# Patient Record
Sex: MALE | Race: WHITE | Employment: OTHER | URBAN - METROPOLITAN AREA
[De-identification: names, ages, dates, MRNs, and addresses within clinical notes are randomized per-mention and may not be internally consistent; named-entity substitution may affect disease eponyms.]

---

## 2017-05-24 ENCOUNTER — OFFICE VISIT (OUTPATIENT)
Dept: CARDIOLOGY CLINIC | Age: 77
End: 2017-05-24

## 2017-05-24 VITALS
SYSTOLIC BLOOD PRESSURE: 142 MMHG | HEART RATE: 58 BPM | HEIGHT: 70 IN | WEIGHT: 235 LBS | OXYGEN SATURATION: 94 % | BODY MASS INDEX: 33.64 KG/M2 | DIASTOLIC BLOOD PRESSURE: 80 MMHG

## 2017-05-24 DIAGNOSIS — E78.00 HYPERCHOLESTEROLEMIA: ICD-10-CM

## 2017-05-24 DIAGNOSIS — I25.10 ATHEROSCLEROSIS OF NATIVE CORONARY ARTERY OF NATIVE HEART WITHOUT ANGINA PECTORIS: Primary | ICD-10-CM

## 2017-05-24 DIAGNOSIS — I49.3 PVC (PREMATURE VENTRICULAR CONTRACTION): ICD-10-CM

## 2017-05-24 DIAGNOSIS — I10 ESSENTIAL HYPERTENSION: ICD-10-CM

## 2017-05-24 NOTE — PROGRESS NOTES
HISTORY OF PRESENT ILLNESS  Jany Cruz is a 68 y.o. male. HPI  He has been doing well. He has remained asymptomatic. He has had no chest pain, dyspnea, orthopnea or PND. He denies palpitations, dizziness or syncope. He has had no symptoms to indicate TIA or amaurosis fugax. He has had mild intermittent leg edema that is most likely secondary to calcium channel blockade. He has known coronary artery disease documented by cardiac catheterization in February 1999, and had repeat cardiac catheterization in September 2006 at the [facility] which demonstrated:    1. A long 80% to 90% segmental stenosis of the proximal and left anterior descending artery all the way up to the left main trunk. The left main trunk, however, is a very small segment which has virtually a double ostium.    2. Large, codominant circumflex artery with a questionable stenosis in the mid segment, which appears to be more foreshortening rather than a true lesion.    3. Patent codominant right coronary artery.    4. Normal left ventricular systolic function, with estimated EF in the 60% range.    The cardiologist at the [facility] recommended that he have coronary artery bypass grafting to the LAD and the distal circumflex branch. Reviewing his coronary angiogram, it was felt that he could be best treated with stenting of the LAD, since the circumflex artery lesion did not appear to be significant.  He subsequently underwent stenting of the LAD on October 23, 2006, successfully, with a 3 x 33-mm Cypher stent.    His LV function was well preserved and ejection fraction was in the 55-60% range by echocardiogram. He did have the PVCs which he has been known to have had for a number of years.   Andrew Pozo  He underwent stress nuclear cardiac imaging as required by the [facility] Beth Reyes for his  license on 8/86/50 which demonstrated small area of distal posterolateral wall defect which was partially reversible and there was some defect in the inferior wall consistent with diaphragmatic attenuation. The ejection fraction was estimated in the 51% range. Because of equivocally abnormal stress nuclear cardiac imaging, he underwent stress echocardiogram to clarify on 9/11/13 which demonstrated no significant wall motion abnormalities with exercise to indicate ischemia. The estimated ejection fraction was 55% which increased up to 60-65% with exercise. His cholesterol profile has been satisfactory and his hemoglobin A1c is down to 6.3%. He has had PVCs which appear to be benign and he has had no clinical indication of ventricular tachycardia thus far.    He had repeat stress nuclear cardiac imaging on 10/2/14 as required by the [address] [place] [address]  to maintain his  license. I personally reviewed his perfusion imaging pictures which appears to be essentially unchanged compared to one from 8/27/13. There is a small to moderate size scarring in the basal inferior and mid inferior wall which may well be scar or diaphragmatic attenuation and also some fixed defect in the distal posterolateral wall. There appears to be no reversibility indicating ischemia. Once again, he was found to have PVCs during the test which was reported and observed in the past. He is known to have had the PVCs for many number of years. The ejection fraction was estimated in the 48% range, however he did have reduced ejection fraction in August 2013 by gated spec imaging in the 51% range. However ,during the stress echocardiogram, he was found to have baseline EF In the 55% range which increased up to 60-65% range with exercise at that time in September 2013. There appears to be a great possibility of gating error at the time of stress nuclear cardiac imaging because of the PVCs.  At any rate, there appears to be no significant change in the EF between the stress nuclear cardiac imaging in August 2013 and October 2014.    He underwent the annual stress nuclear cardiac imaging for his  license on 10/13/15, which demonstrated no evidence of significant perfusion imaging abnormalities to indicate the scarring or ischemia.  The ejection fraction was calculated at 42%, which could well be related to a gating error because of the PVCs.                    He has developed an allergic reaction to Lisinopril with lip and tongue swelling and, subsequently, the Lisinopril has been replaced with amlodipine.   He underwent a Holter monitor on 11/16/15 to evaluate the PVCs, which demonstrated baseline normal sinus rhythm. There were multiple PVCs with some paired beats or trigeminy and runs of bigeminy, but no ventricular tachycardia. There were some PACs and thirteen short runs of SVT with the longest of 82 beats at a rate of 159 BPM. There were no significant bradycardia pauses. He had repeat stress nuclear cardiac imaging, which was required for his s license, on 16/47/1424, which demonstrated no significant perfusion abnormalities to indicate ischemia or infarction. Ejection fraction was in the range of 53%. Review of Systems   Constitutional: Negative for malaise/fatigue and weight loss. HENT: Negative for hearing loss. Eyes: Negative for blurred vision and double vision. Respiratory: Negative for shortness of breath. Cardiovascular: Positive for leg swelling. Negative for chest pain, palpitations, orthopnea, claudication and PND. Gastrointestinal: Negative for blood in stool, heartburn and melena. Genitourinary: Positive for frequency. Negative for dysuria, hematuria and urgency. Musculoskeletal: Positive for joint pain. Negative for back pain. Skin: Negative for itching and rash. Neurological: Negative for dizziness, loss of consciousness, weakness and headaches. Psychiatric/Behavioral: Negative for depression and memory loss. Physical Exam   Constitutional: He is oriented to person, place, and time. He appears well-developed and well-nourished.    HENT:   Head: Normocephalic and atraumatic. Eyes: Conjunctivae are normal. Pupils are equal, round, and reactive to light. Neck: Normal range of motion. Neck supple. No JVD present. Cardiovascular: Regular rhythm, S1 normal and S2 normal.   Extrasystoles are present. Bradycardia present. PMI is not displaced. Exam reveals no gallop and no friction rub. No murmur heard. Pulses:       Carotid pulses are 3+ on the right side, and 3+ on the left side. Pulmonary/Chest: Effort normal. He has no rales. Abdominal: Soft. There is no tenderness. Musculoskeletal: He exhibits edema. trace   Neurological: He is alert and oriented to person, place, and time. No cranial nerve deficit. Skin: Skin is warm and dry. Psychiatric: He has a normal mood and affect. His behavior is normal.     Visit Vitals    /80 (BP 1 Location: Left arm, BP Patient Position: Sitting)    Pulse (!) 58    Ht 5' 10\" (1.778 m)    Wt 106.6 kg (235 lb)    SpO2 94%    BMI 33.72 kg/m2       Past Medical History:   Diagnosis Date    Cardiac cath 10/23/2006    LM patent. oLAD 85% (3 x 33 mm Cypher stent). mCx 60%. RCA patent.  Cardiac Holter monitor study 11/16/2015    Sinus rhythm, avg HR 69 bpm (range  bpm). Freq VEs, mainly single but also paired, trigeminy & bigeminy. Occasional SVE, mainly single but some pairs. Thirteen runs of SVT, longest 82 beats at 159 bpm.  No significant pauses.  Cardiac nuclear imaging test 11/21/2016    No ischemia or prior infarction. No RWMA. EF 53%. Neg EKG on pharm stress test.    Cardiac stress echocardiogram 09/11/2013    Negative maximal stress echo by EKG & echo criteria. No chest pain. EF 60-65%. No new RWMA. Ex time 9 min 31 sec.     Coronary artery disease     status post stenting of the LAD in Oct. 2006/EF 60%    Diabetes mellitus ([facility] [address]     Hypercholesterolemia     Hypertension      PVC (premature ventricular contraction)        Social History     Social History    Marital status:      Spouse name: N/A    Number of children: N/A    Years of education: N/A     Occupational History    Not on file. Social History Main Topics    Smoking status: Never Smoker    Smokeless tobacco: Never Used    Alcohol use Yes      Comment: occasional    Drug use: No    Sexual activity: Not on file     Other Topics Concern    Not on file     Social History Narrative       History reviewed. No pertinent family history. Past Surgical History:   Procedure Laterality Date    HX CORONARY STENT PLACEMENT  10/23/06    Stenting of LAD (3 x 33-mm Cypher stent)    HX HEART CATHETERIZATION  02/1999    HX HEART CATHETERIZATION  09/2006       Current Outpatient Prescriptions   Medication Sig Dispense Refill    ERGOCALCIFEROL, VITAMIN D2, (VITAMIN D2 PO) Take  by mouth.  CYANOCOBALAMIN, VITAMIN B-12, (VITAMIN B-12 PO) Take  by mouth.  amLODIPine (NORVASC) 5 mg tablet daily. 0    ibuprofen (MOTRIN) 800 mg tablet Take  by mouth.  nitroglycerin (NITROQUICK) 0.4 mg SL tablet 1 Tab by SubLINGual route every five (5) minutes as needed. 25 Tab 3    metoprolol succinate (TOPROL XL) 25 mg XL tablet Take 1 Tab by mouth daily. 90 Tab 3    clopidogrel (PLAVIX) 75 mg tablet Take 1 Tab by mouth daily. 90 Tab 3    aspirin 81 mg tablet Take 81 mg by mouth daily.  metformin (GLUCOPHAGE) 500 mg tablet Take 500 mg by mouth daily (with breakfast).  simvastatin (ZOCOR) 40 mg tablet Take 40 mg by mouth nightly.  ascorbic acid (VITAMIN C) 500 mg tablet Take 500 mg by mouth daily.  folic acid 806 mcg tablet Take 400 mcg by mouth daily. EKG: unchanged from previous tracings, nonspecific ST and T waves changes, sinus bradycardia, left axis deviation. ASSESSMENT and PLAN  Encounter Diagnoses   Name Primary?  Coronary artery disease, status post stenting of the LAD in October 2006/EF 55- 60%.  Yes    PVC (premature ventricular contraction)     Essential hypertension     Hypercholesterolemia    He has been doing well. He has had no symptoms to indicate angina or cardiac decompensation. His blood pressure has been under control. His leg edema is related to calcium channel blockade, but not severe enough to discontinue medication. He has had benign PVCs chronically. He does not seem to have had any major ventricular arrhythmia. At this point, he will be continued on his current medical regimen. He has remained active and he was encouraged to exercise more regularly.

## 2017-05-24 NOTE — MR AVS SNAPSHOT
Visit Information Date & Time Provider Department Dept. Phone Encounter #  
 5/24/2017 10:40 AM Asher Mueller MD [facility] [phone] [id number] Follow-up Instructions Return in about 6 months (around 11/24/2017). Upcoming Health Maintenance Date Due  
 FOOT EXAM Q1 11/9/1950 MICROALBUMIN Q1 11/9/1950 EYE EXAM RETINAL OR DILATED Q1 11/9/1950 DTaP/Tdap/Td series (1 - Tdap) 11/9/1961 ZOSTER VACCINE AGE 60> 11/9/2000 GLAUCOMA SCREENING Q2Y 11/9/2005 Pneumococcal 65+ Low/Medium Risk (1 of 2 - PCV13) 11/9/2005 MEDICARE YEARLY EXAM 11/9/2005 HEMOGLOBIN A1C Q6M 4/3/2015 LIPID PANEL Q1 10/3/2015 INFLUENZA AGE 9 TO ADULT 8/1/2017 Allergies as of 5/24/2017  Review Complete On: 5/24/2017 By: Asher Mueller MD  
  
 Severity Noted Reaction Type Reactions Ivp Dye [Fd And C Blue No.1]  01/13/2011    Unknown (comments) Lisinopril  11/03/2015    Swelling Current Immunizations  Never Reviewed No immunizations on file. Not reviewed this visit You Were Diagnosed With   
  
 Codes Comments Atherosclerosis of native coronary artery of native heart without angina pectoris    -  Primary ICD-10-CM: I25.10 ICD-9-CM: 414.01   
 PVC (premature ventricular contraction)     ICD-10-CM: I49.3 ICD-9-CM: 427.69 Essential hypertension     ICD-10-CM: I10 
ICD-9-CM: 401.9 Vitals BP Pulse Height(growth percentile) Weight(growth percentile) SpO2 BMI  
 142/80 (BP 1 Location: Left arm, BP Patient Position: Sitting) (!) 58 5' 10\" (1.778 m) 235 lb (106.6 kg) 94% 33.72 kg/m2 Smoking Status Never Smoker Vitals History BMI and BSA Data Body Mass Index Body Surface Area 33.72 kg/m 2 2.29 m 2 Preferred Pharmacy Pharmacy Name Phone [facility] [phone] [place] [phone] [place] [phone] Aaron Mina Your Updated Medication List  
  
   
 This list is accurate as of: 5/24/17 11:24 AM.  Always use your most recent med list. amLODIPine 5 mg tablet Commonly known as:  NORVASC  
daily. aspirin 81 mg tablet Take 81 mg by mouth daily. clopidogrel 75 mg Tab Commonly known as:  PLAVIX Take 1 Tab by mouth daily. folic acid 919 mcg tablet Take 400 mcg by mouth daily. GLUCOPHAGE 500 mg tablet Generic drug:  metFORMIN Take 500 mg by mouth daily (with breakfast). ibuprofen 800 mg tablet Commonly known as:  MOTRIN Take  by mouth.  
  
 metoprolol succinate 25 mg XL tablet Commonly known as:  TOPROL XL Take 1 Tab by mouth daily. nitroglycerin 0.4 mg SL tablet Commonly known as:  [place] 1 Tab by SubLINGual route every five (5) minutes as needed. VITAMIN B-12 PO Take  by mouth. VITAMIN C 500 mg tablet Generic drug:  ascorbic acid (vitamin C) Take 500 mg by mouth daily. VITAMIN D2 PO Take  by mouth. ZOCOR 40 mg tablet Generic drug:  simvastatin Take 40 mg by mouth nightly. We Performed the Following AMB POC EKG ROUTINE W/ 12 LEADS, INTER & REP [46574 CPT(R)] Follow-up Instructions Return in about 6 months (around 11/24/2017). Introducing [facility]! [employer] introduces [employer] patient portal. Now you can access parts of your medical record, email your doctor's office, and request medication refills online. 1. In your internet browser, go to [email or web address] 2. Click on the First Time User? Click Here link in the Sign In box. You will see the New Member Sign Up page. 3. Enter your [employer] Access Code exactly as it appears below. You will not need to use this code after youve completed the sign-up process. If you do not sign up before the expiration date, you must request a new code. · [employer] Access Code: [email or web address] Expires: 8/22/2017 11:24 AM 
 
 4. Enter the last four digits of your Social Security Number (xxxx) and Date of Birth (mm/dd/yyyy) as indicated and click Submit. You will be taken to the next sign-up page. 5. Create a [employer] ID. This will be your [employer] login ID and cannot be changed, so think of one that is secure and easy to remember. 6. Create a [employer] password. You can change your password at any time. 7. Enter your Password Reset Question and Answer. This can be used at a later time if you forget your password. 8. Enter your e-mail address. You will receive e-mail notification when new information is available in [address] 9. Click Sign Up. You can now view and download portions of your medical record. 10. Click the Download Summary menu link to download a portable copy of your medical information. If you have questions, please visit the Frequently Asked Questions section of the [employer] website. Remember, [employer] is NOT to be used for urgent needs. For medical emergencies, dial 911. Now available from your iPhone and Android! Please provide this summary of care documentation to your next provider. Your primary care clinician is listed as Moises Hester. If you have any questions after today's visit, please call [phone].

## 2018-02-12 ENCOUNTER — OFFICE VISIT (OUTPATIENT)
Dept: CARDIOLOGY CLINIC | Age: 78
End: 2018-02-12

## 2018-02-12 VITALS
HEIGHT: 70 IN | DIASTOLIC BLOOD PRESSURE: 90 MMHG | HEART RATE: 68 BPM | SYSTOLIC BLOOD PRESSURE: 140 MMHG | BODY MASS INDEX: 33.18 KG/M2 | WEIGHT: 231.8 LBS | OXYGEN SATURATION: 96 %

## 2018-02-12 DIAGNOSIS — I49.3 PVC (PREMATURE VENTRICULAR CONTRACTION): ICD-10-CM

## 2018-02-12 DIAGNOSIS — I51.9 DIASTOLIC DYSFUNCTION, LEFT VENTRICLE: ICD-10-CM

## 2018-02-12 DIAGNOSIS — E78.00 HYPERCHOLESTEROLEMIA: ICD-10-CM

## 2018-02-12 DIAGNOSIS — I25.10 ATHEROSCLEROSIS OF NATIVE CORONARY ARTERY OF NATIVE HEART WITHOUT ANGINA PECTORIS: ICD-10-CM

## 2018-02-12 DIAGNOSIS — R06.09 DOE (DYSPNEA ON EXERTION): Primary | ICD-10-CM

## 2018-02-12 DIAGNOSIS — I10 ESSENTIAL HYPERTENSION: ICD-10-CM

## 2018-02-12 NOTE — PATIENT INSTRUCTIONS
Record blood pressure readings for 2 weeks and drop them off to the office for Dr Gama Heading to review    [phone] fax number
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
## 2018-02-12 NOTE — MR AVS SNAPSHOT
[address] [address] [address] [address] [phone] Patient: Bandar Marie MRN: [id number] ILDA:[birth year] Visit Information Date & Time Provider Department Dept. Phone Encounter #  
 2/12/2018 10:20 AM Osmar Vargas MD [facility] [address] [id number] Upcoming Health Maintenance Date Due  
 FOOT EXAM Q1 11/9/1950 MICROALBUMIN Q1 11/9/1950 EYE EXAM RETINAL OR DILATED Q1 11/9/1950 DTaP/Tdap/Td series (1 - Tdap) 11/9/1961 ZOSTER VACCINE AGE 60> 9/9/2000 GLAUCOMA SCREENING Q2Y 11/9/2005 Pneumococcal 65+ Low/Medium Risk (1 of 2 - PCV13) 11/9/2005 MEDICARE YEARLY EXAM 11/9/2005 HEMOGLOBIN A1C Q6M 4/3/2015 LIPID PANEL Q1 10/3/2015 Influenza Age 5 to Adult 8/1/2017 Allergies as of 2/12/2018  Review Complete On: 2/12/2018 By: Osmar Vargas MD  
  
 Severity Noted Reaction Type Reactions Ivp Dye [Fd And C Blue No.1]  01/13/2011    Unknown (comments) Lisinopril  11/03/2015    Swelling Current Immunizations  Never Reviewed No immunizations on file. Not reviewed this visit You Were Diagnosed With   
  
 Codes Comments Atherosclerosis of native coronary artery of native heart without angina pectoris    -  Primary ICD-10-CM: I25.10 ICD-9-CM: 414.01   
 PVC (premature ventricular contraction)     ICD-10-CM: I49.3 ICD-9-CM: 427.69 Essential hypertension     ICD-10-CM: I10 
ICD-9-CM: 401.9 Hypercholesterolemia     ICD-10-CM: E78.00 ICD-9-CM: 272.0 Vitals BP Pulse Height(growth percentile) Weight(growth percentile) SpO2 BMI  
 140/90 68 5' 10\" (1.778 m) 231 lb 12.8 oz (105.1 kg) 96% 33.26 kg/m2 Smoking Status Never Smoker BMI and BSA Data Body Mass Index Body Surface Area  
 33.26 kg/m 2 2.28 m 2 Preferred Pharmacy Pharmacy Name Phone [facility] [address] [address] Aaron Mina Your Updated Medication List  
  
   
This list is accurate as of: 2/12/18 10:59 AM.  Always use your most recent med list. amLODIPine 5 mg tablet Commonly known as:  NORVASC  
daily. aspirin 81 mg tablet Take 81 mg by mouth daily. clopidogrel 75 mg Tab Commonly known as:  PLAVIX Take 1 Tab by mouth daily. folic acid 379 mcg tablet Take 400 mcg by mouth daily. GLUCOPHAGE 500 mg tablet Generic drug:  metFORMIN Take 500 mg by mouth daily (with breakfast). ibuprofen 800 mg tablet Commonly known as:  MOTRIN Take  by mouth.  
  
 metoprolol succinate 25 mg XL tablet Commonly known as:  TOPROL XL Take 1 Tab by mouth daily. nitroglycerin 0.4 mg SL tablet Commonly known as:  Stoney Nunnery 1 Tab by SubLINGual route every five (5) minutes as needed. omega 3-dha-epa-fish oil 100-160-1,000 mg Cap Take  by mouth. VITAMIN B-12 PO Take  by mouth. VITAMIN C 500 mg tablet Generic drug:  ascorbic acid (vitamin C) Take 500 mg by mouth daily. VITAMIN D2 PO Take  by mouth. ZOCOR 40 mg tablet Generic drug:  simvastatin Take 40 mg by mouth nightly. We Performed the Following AMB POC EKG ROUTINE W/ 12 LEADS, INTER & REP [63784 CPT(R)] Patient Instructions Record blood pressure readings for 2 weeks and drop them off to the office for Dr Sussy Scott to review [phone] fax number Introducing [facility]! Esvin Martinez introduces [employer] patient portal. Now you can access parts of your medical record, email your doctor's office, and request medication refills online. 1. In your internet browser, go to [email or web address] 2. Click on the First Time User? Click Here link in the Sign In box. You will see the New Member Sign Up page. 3. Enter your Vinja Access Code exactly as it appears below. You will not need to use this code after youve completed the sign-up process. If you do not sign up before the expiration date, you must request a new code. · Vinja Access Code: [email or web address] Expires: 5/13/2018 10:59 AM 
 
4. Enter the last four digits of your Social Security Number (xxxx) and Date of Birth (mm/dd/yyyy) as indicated and click Submit. You will be taken to the next sign-up page. 5. Create a Vinja ID. This will be your Vinja login ID and cannot be changed, so think of one that is secure and easy to remember. 6. Create a Vinja password. You can change your password at any time. 7. Enter your Password Reset Question and Answer. This can be used at a later time if you forget your password. 8. Enter your e-mail address. You will receive e-mail notification when new information is available in [address] 9. Click Sign Up. You can now view and download portions of your medical record. 10. Click the Download Summary menu link to download a portable copy of your medical information. If you have questions, please visit the Frequently Asked Questions section of the Vinja website. Remember, Vinja is NOT to be used for urgent needs. For medical emergencies, dial 911. Now available from your iPhone and Android! Please provide this summary of care documentation to your next provider. Your primary care clinician is listed as Maria Esther Wells. If you have any questions after today's visit, please call [phone].

## 2018-02-12 NOTE — PROGRESS NOTES
1. Have you been to the ER, urgent care clinic since your last visit? Hospitalized since your last visit? No    2. Have you seen or consulted any other health care providers outside of the [address] [place] [address] since your last visit? Include any pap smears or colon screening.  No

## 2018-12-05 ENCOUNTER — OFFICE VISIT (OUTPATIENT)
Dept: CARDIOLOGY CLINIC | Age: 78
End: 2018-12-05

## 2018-12-05 VITALS
OXYGEN SATURATION: 97 % | DIASTOLIC BLOOD PRESSURE: 80 MMHG | SYSTOLIC BLOOD PRESSURE: 130 MMHG | HEART RATE: 58 BPM | HEIGHT: 70 IN | BODY MASS INDEX: 32.93 KG/M2 | WEIGHT: 230 LBS

## 2018-12-05 DIAGNOSIS — E78.00 HYPERCHOLESTEROLEMIA: ICD-10-CM

## 2018-12-05 DIAGNOSIS — I25.10 ATHEROSCLEROSIS OF NATIVE CORONARY ARTERY OF NATIVE HEART WITHOUT ANGINA PECTORIS: Primary | ICD-10-CM

## 2018-12-05 DIAGNOSIS — I49.3 PVC (PREMATURE VENTRICULAR CONTRACTION): ICD-10-CM

## 2018-12-05 DIAGNOSIS — R06.09 DOE (DYSPNEA ON EXERTION): ICD-10-CM

## 2018-12-05 DIAGNOSIS — I51.9 DIASTOLIC DYSFUNCTION, LEFT VENTRICLE: ICD-10-CM

## 2018-12-05 DIAGNOSIS — I10 ESSENTIAL HYPERTENSION: ICD-10-CM

## 2018-12-05 NOTE — PROGRESS NOTES
Jerad Westbrook. presents today for Chief Complaint Patient presents with  Follow-up  
  9 month Jerad Westbrook. preferred language for health care discussion is english/other. Is someone accompanying this pt? Yes, wife Is the patient using any DME equipment during OV? No 
 
Depression Screening: PHQ over the last two weeks 4/2/2014 Little interest or pleasure in doing things Not at all Feeling down, depressed, irritable, or hopeless Not at all Total Score PHQ 2 0 Learning Assessment: 
Learning Assessment 4/2/2014 PRIMARY LEARNER Patient HIGHEST LEVEL OF EDUCATION - PRIMARY LEARNER  > 4 YEARS OF COLLEGE  
BARRIERS PRIMARY LEARNER NONE  
CO-LEARNER CAREGIVER No  
PRIMARY LANGUAGE ENGLISH  NEED No  
LEARNER PREFERENCE PRIMARY DEMONSTRATION  
  LISTENING  
  PICTURES  
  READING  
ANSWERED BY patient RELATIONSHIP SELF Abuse Screening: No flowsheet data found. Fall Risk Fall Risk Assessment, last 12 mths 4/2/2014 Able to walk? Yes Fall in past 12 months? No  
 
 
Pt currently taking Antiplatelet therapy? Plavix and Aspirin Coordination of Care: 1. Have you been to the ER, urgent care clinic since your last visit? Hospitalized since your last visit? No 
 
2. Have you seen or consulted any other health care providers outside of the [address] [place] [address] since your last visit? Include any pap smears or colon screening.  No

## 2018-12-05 NOTE — PROGRESS NOTES
HISTORY OF PRESENT ILLNESS 
Chelsy Nichole is a 66 y.o. male. HPI He has been doing very well. He denies any cardiac symptoms. He denies chest pain, dyspnea, orthopnea, PND. He denies palpitation, dizziness or syncope. He denies any symptoms of TIA or amaurosis fugax. 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 Cardiac catheterization in September 2006 at the [facility] which demonstrated:   
1. A long 80% to 90% segmental stenosis of the proximal and left anterior descending artery all the way up to the left main trunk. The left main trunk, however, is a very small segment which has virtually a double ostium.   
2. Large, codominant circumflex artery with a questionable stenosis in the mid segment, which appears to be more foreshortening rather than a true lesion.   
3. Patent codominant right coronary artery.   
4. Normal left ventricular systolic function, with estimated EF in the 60% range.   
The cardiologist at the [facility] recommended that he have coronary artery bypass grafting to the LAD and the distal circumflex branch. Reviewing his coronary angiogram, it was felt that he could be best treated with stenting of the LAD, since the circumflex artery lesion did not appear to be significant. He subsequently underwent stenting of the LAD on October 23, 2006, successfully, with a 3 x 33-mm Cypher stent.   
His LV function was well preserved and ejection fraction was in the 55-60% range by echocardiogram. He did have the PVCs which he has been known to have had for a number of years.  
[facility] 
He underwent stress nuclear cardiac imaging as required by the [address] [place], [place] [address]mer[place]ez Reyes for his  license on 7/02/69 which demonstrated small area of distal posterolateral wall defect which was partially reversible and there was some defect in the inferior wall consistent with diaphragmatic attenuation. The ejection fraction was estimated in the 51% range.  Because of equivocally abnormal stress nuclear cardiac imaging, he underwent stress echocardiogram to clarify on 9/11/13 which demonstrated no significant wall motion abnormalities with exercise to indicate ischemia. The estimated ejection fraction was 55% which increased up to 60-65% with exercise. His cholesterol profile has been satisfactory and his hemoglobin A1c is down to 6.3%. He has had PVCs which appear to be benign and he has had no clinical indication of ventricular tachycardia thus far.   
He had repeat stress nuclear cardiac imaging on 10/2/14 as required by the [address] [place] [address]  to maintain his  license. I personally reviewed his perfusion imaging pictures which appears to be essentially unchanged compared to one from 8/27/13. There is a small to moderate size scarring in the basal inferior and mid inferior wall which may well be scar or diaphragmatic attenuation and also some fixed defect in the distal posterolateral wall. There appears to be no reversibility indicating ischemia. Once again, he was found to have PVCs during the test which was reported and observed in the past. He is known to have had the PVCs for many number of years. The ejection fraction was estimated in the 48% range, however he did have reduced ejection fraction in August 2013 by gated spec imaging in the 51% range. However ,during the stress echocardiogram, he was found to have baseline EF In the 55% range which increased up to 60-65% range with exercise at that time in September 2013. There appears to be a great possibility of gating error at the time of stress nuclear cardiac imaging because of the PVCs.  At any rate, there appears to be no significant change in the EF between the stress nuclear cardiac imaging in August 2013 and October 2014.   
He underwent the annual stress nuclear cardiac imaging for his  license on 50/60/70, which demonstrated no evidence of significant perfusion imaging abnormalities to indicate the scarring or ischemia.  The ejection fraction was calculated at 42%, which could well be related to a gating error because of the PVCs.                   
He has developed an allergic reaction to Lisinopril with lip and tongue swelling and, subsequently, the Lisinopril has been replaced with amlodipine.  He underwent a Holter monitor on 11/16/15 to evaluate the PVCs, which demonstrated baseline normal sinus rhythm. There were multiple PVCs with some paired beats or trigeminy and runs of bigeminy, but no ventricular tachycardia. There were some PACs and thirteen short runs of SVT with the longest of 82 beats at a rate of 159 BPM. There were no significant bradycardia pauses. He had repeat stress nuclear cardiac imaging, which was required for his s license, on 66/50/2855, which demonstrated no significant perfusion abnormalities to indicate ischemia or infarction. Ejection fraction was in the range of 53%. Review of Systems Constitutional: Negative for malaise/fatigue and weight loss. HENT: Negative for hearing loss. Eyes: Negative for blurred vision and double vision. Respiratory: Negative for shortness of breath. Cardiovascular: Positive for leg swelling. Negative for chest pain, palpitations, orthopnea, claudication and PND. Gastrointestinal: Negative for blood in stool, heartburn and melena. Genitourinary: Positive for frequency. Negative for dysuria, hematuria and urgency. Musculoskeletal: Positive for joint pain. Negative for back pain. Skin: Negative for itching and rash. Neurological: Negative for dizziness, loss of consciousness and weakness. Psychiatric/Behavioral: Negative for depression and memory loss. Physical Exam  
Constitutional: He is oriented to person, place, and time. He appears well-developed and well-nourished. HENT:  
Head: Normocephalic and atraumatic. Eyes: Conjunctivae are normal. Pupils are equal, round, and reactive to light. Neck: Normal range of motion. Neck supple. No JVD present. Cardiovascular: Regular rhythm, S1 normal and S2 normal.  No extrasystoles are present. Bradycardia present. PMI is not displaced. Exam reveals no gallop and no friction rub. No murmur heard. Pulses: 
     Carotid pulses are 3+ on the right side, and 3+ on the left side. Pulmonary/Chest: Effort normal. He has no rales. Abdominal: Soft. There is no tenderness. Musculoskeletal: He exhibits no edema. Neurological: He is alert and oriented to person, place, and time. No cranial nerve deficit. Skin: Skin is warm and dry. Psychiatric: He has a normal mood and affect. His behavior is normal.  
 
Visit Vitals /80 Pulse (!) 58 Ht 5' 10\" (1.778 m) Wt 104.3 kg (230 lb) SpO2 97% BMI 33.00 kg/m² Past Medical History:  
Diagnosis Date  Cardiac cath 10/23/2006 LM patent. oLAD 85% (3 x 33 mm Cypher stent). mCx 60%. RCA patent.  Cardiac Holter monitor study 11/16/2015 Sinus rhythm, avg HR 69 bpm (range  bpm). Freq VEs, mainly single but also paired, trigeminy & bigeminy. Occasional SVE, mainly single but some pairs. Thirteen runs of SVT, longest 82 beats at 159 bpm.  No significant pauses.  Cardiac nuclear imaging test 11/21/2016 No ischemia or prior infarction. No RWMA. EF 53%. Neg EKG on pharm stress test.  
 Cardiac stress echocardiogram 09/11/2013 Negative maximal stress echo by EKG & echo criteria. No chest pain. EF 60-65%. No new RWMA. Ex time 9 min 31 sec.  Coronary artery disease   
 status post stenting of the LAD in Oct. 2006/EF 60%  Diabetes mellitus ([address]  Hypercholesterolemia  Hypertension  PVC (premature ventricular contraction) Social History Socioeconomic History  Marital status:  Spouse name: Not on file  Number of children: Not on file  Years of education: Not on file  Highest education level: Not on file Social Needs  Financial resource strain: Not on file  Food insecurity - worry: Not on file  Food insecurity - inability: Not on file  Transportation needs - medical: Not on file  Transportation needs - non-medical: Not on file Occupational History  Not on file Tobacco Use  Smoking status: Never Smoker  Smokeless tobacco: Never Used Substance and Sexual Activity  Alcohol use: Yes Comment: occasional  
 Drug use: No  
 Sexual activity: Not on file Other Topics Concern  Not on file Social History Narrative  Not on file History reviewed. No pertinent family history. Past Surgical History:  
Procedure Laterality Date  HX CORONARY STENT PLACEMENT  10/23/06 Stenting of LAD (3 x 33-mm Cypher stent)  HX HEART CATHETERIZATION  02/1999  HX HEART CATHETERIZATION  09/2006 Current Outpatient Medications Medication Sig Dispense Refill  omega 3-dha-epa-fish oil 100-160-1,000 mg cap Take  by mouth.  ERGOCALCIFEROL, VITAMIN D2, (VITAMIN D2 PO) Take  by mouth.  CYANOCOBALAMIN, VITAMIN B-12, (VITAMIN B-12 PO) Take  by mouth.  amLODIPine (NORVASC) 5 mg tablet daily. 0  
 ibuprofen (MOTRIN) 800 mg tablet Take  by mouth.  nitroglycerin (NITROQUICK) 0.4 mg SL tablet 1 Tab by SubLINGual route every five (5) minutes as needed. 25 Tab 3  
 metoprolol succinate (TOPROL XL) 25 mg XL tablet Take 1 Tab by mouth daily. 90 Tab 3  clopidogrel (PLAVIX) 75 mg tablet Take 1 Tab by mouth daily. 90 Tab 3  
 aspirin 81 mg tablet Take 81 mg by mouth daily.  metformin (GLUCOPHAGE) 500 mg tablet Take 500 mg by mouth daily (with breakfast).  simvastatin (ZOCOR) 40 mg tablet Take 40 mg by mouth nightly.  ascorbic acid (VITAMIN C) 500 mg tablet Take 500 mg by mouth daily.  folic acid 550 mcg tablet Take 400 mcg by mouth daily. EKG: unchanged from previous tracings, normal sinus rhythm, nonspecific ST and T waves changes, left axis deviation, upper limit SD,rotation of the heart Irina Cantor ASSESSMENT and PLAN Encounter Diagnoses Name Primary?  Atherosclerosis of native coronary artery of native heart without angina pectoris Yes Comment: Stent LAD Oct.2006,EF 55-60%  MENDOZA (dyspnea on exertion)  PVC (premature ventricular contraction)  Essential hypertension  Hypercholesterolemia  Diastolic dysfunction, left ventricle He has been doing very well. He has had no symptoms to indicate angina or cardiac decompensation. He has had no symptoms to indicate significant cardiac arrhythmia. His blood pressure has been under control. For now, continue on current medical regimen. He will have follow up stress nuclear cardiac imaging next year.

## 2018-12-06 RX ORDER — NITROGLYCERIN 0.4 MG/1
0.4 TABLET SUBLINGUAL
Qty: 25 TAB | Refills: 3 | Status: SHIPPED | OUTPATIENT
Start: 2018-12-06

## 2019-06-05 ENCOUNTER — OFFICE VISIT (OUTPATIENT)
Dept: CARDIOLOGY CLINIC | Age: 79
End: 2019-06-05

## 2019-06-05 DIAGNOSIS — I51.9 DIASTOLIC DYSFUNCTION, LEFT VENTRICLE: ICD-10-CM

## 2019-06-05 DIAGNOSIS — I49.3 PVC (PREMATURE VENTRICULAR CONTRACTION): ICD-10-CM

## 2019-06-05 DIAGNOSIS — E78.00 HYPERCHOLESTEROLEMIA: ICD-10-CM

## 2019-06-05 DIAGNOSIS — I10 ESSENTIAL HYPERTENSION: ICD-10-CM

## 2019-06-05 DIAGNOSIS — I25.10 ATHEROSCLEROSIS OF NATIVE CORONARY ARTERY OF NATIVE HEART WITHOUT ANGINA PECTORIS: Primary | ICD-10-CM

## 2019-06-05 NOTE — PROGRESS NOTES
HISTORY OF PRESENT ILLNESS  Dionicio Hess is a 66 y.o. male. HPI  He has been doing very well. He is still flying an airplane. He has had no chest pain, dyspnea, orthopnea, PND. He denies palpitations, dizziness or syncope. He has had no symptoms to indicate TIA or amaurosis fugax. Cardiac catheterization in September 2006 at the [facility] which demonstrated:    1. A long 80% to 90% segmental stenosis of the proximal and left anterior descending artery all the way up to the left main trunk. The left main trunk, however, is a very small segment which has virtually a double ostium.    2. Large, codominant circumflex artery with a questionable stenosis in the mid segment, which appears to be more foreshortening rather than a true lesion.    3. Patent codominant right coronary artery.    4. Normal left ventricular systolic function, with estimated EF in the 60% range.    The cardiologist at the [facility] recommended that he have coronary artery bypass grafting to the LAD and the distal circumflex branch. Reviewing his coronary angiogram, it was felt that he could be best treated with stenting of the LAD, since the circumflex artery lesion did not appear to be significant. He subsequently underwent stenting of the LAD on October 23, 2006, successfully, with a 3 x 33-mm Cypher stent.    His LV function was well preserved and ejection fraction was in the 55-60% range by echocardiogram. He did have the PVCs which he has been known to have had for a number of years.   Ruy Chirinos  He underwent stress nuclear cardiac imaging as required by the [address] [place] [address]  for his  license on 4/10/32 which demonstrated small area of distal posterolateral wall defect which was partially reversible and there was some defect in the inferior wall consistent with diaphragmatic attenuation. The ejection fraction was estimated in the 51% range.  Because of equivocally abnormal stress nuclear cardiac imaging, he underwent stress echocardiogram to clarify on 9/11/13 which demonstrated no significant wall motion abnormalities with exercise to indicate ischemia. The estimated ejection fraction was 55% which increased up to 60-65% with exercise. His cholesterol profile has been satisfactory and his hemoglobin A1c is down to 6.3%. He has had PVCs which appear to be benign and he has had no clinical indication of ventricular tachycardia thus far.    He had repeat stress nuclear cardiac imaging on 10/2/14 as required by the [address] [place] [address]  to maintain his  license. I personally reviewed his perfusion imaging pictures which appears to be essentially unchanged compared to one from 8/27/13. There is a small to moderate size scarring in the basal inferior and mid inferior wall which may well be scar or diaphragmatic attenuation and also some fixed defect in the distal posterolateral wall. There appears to be no reversibility indicating ischemia. Once again, he was found to have PVCs during the test which was reported and observed in the past. He is known to have had the PVCs for many number of years. The ejection fraction was estimated in the 48% range, however he did have reduced ejection fraction in August 2013 by gated spec imaging in the 51% range. However ,during the stress echocardiogram, he was found to have baseline EF In the 55% range which increased up to 60-65% range with exercise at that time in September 2013. There appears to be a great possibility of gating error at the time of stress nuclear cardiac imaging because of the PVCs.  At any rate, there appears to be no significant change in the EF between the stress nuclear cardiac imaging in August 2013 and October 2014.    He underwent the annual stress nuclear cardiac imaging for his  license on 69/67/22, which demonstrated no evidence of significant perfusion imaging abnormalities to indicate the scarring or ischemia.  The ejection fraction was calculated at 42%, which could well be related to a gating error because of the PVCs.                    He has developed an allergic reaction to Lisinopril with lip and tongue swelling and, subsequently, the Lisinopril has been replaced with amlodipine.   He underwent a Holter monitor on 11/16/15 to evaluate the PVCs, which demonstrated baseline normal sinus rhythm. There were multiple PVCs with some paired beats or trigeminy and runs of bigeminy, but no ventricular tachycardia. There were some PACs and thirteen short runs of SVT with the longest of 82 beats at a rate of 159 BPM. There were no significant bradycardia pauses. He had repeat stress nuclear cardiac imaging, which was required for his s license, on 12/80/7898, which demonstrated no significant perfusion abnormalities to indicate ischemia or infarction. Ejection fraction was in the range of 53%.       Review of Systems   Constitutional: Negative for malaise/fatigue and weight loss. HENT: Negative for hearing loss. Eyes: Negative for blurred vision and double vision. Respiratory: Negative for shortness of breath. Cardiovascular: Negative for chest pain, palpitations, orthopnea, claudication, leg swelling and PND. Gastrointestinal: Negative for blood in stool, heartburn and melena. Genitourinary: Positive for frequency. Negative for dysuria, hematuria and urgency. Musculoskeletal: Negative for back pain and joint pain. Skin: Negative for itching and rash. Neurological: Negative for dizziness and loss of consciousness. Psychiatric/Behavioral: Negative for depression and memory loss. Physical Exam   Constitutional: He is oriented to person, place, and time. He appears well-developed and well-nourished. HENT:   Head: Normocephalic and atraumatic. Eyes: Pupils are equal, round, and reactive to light. Conjunctivae are normal.   Neck: Normal range of motion. Neck supple. No JVD present.    Cardiovascular: Normal rate, regular rhythm, S1 normal and S2 normal.  No extrasystoles are present. PMI is not displaced. Exam reveals no gallop and no friction rub. No murmur heard. Pulses:       Carotid pulses are 3+ on the right side, and 3+ on the left side. Pulmonary/Chest: Effort normal. He has no rales. Abdominal: Soft. There is no tenderness. Musculoskeletal: He exhibits no edema. Neurological: He is alert and oriented to person, place, and time. No cranial nerve deficit. Skin: Skin is warm and dry. Psychiatric: He has a normal mood and affect. His behavior is normal.     There were no vitals taken for this visit. Past Medical History:   Diagnosis Date    Cardiac cath 10/23/2006    LM patent. oLAD 85% (3 x 33 mm Cypher stent). mCx 60%. RCA patent.  Cardiac Holter monitor study 11/16/2015    Sinus rhythm, avg HR 69 bpm (range  bpm). Freq VEs, mainly single but also paired, trigeminy & bigeminy. Occasional SVE, mainly single but some pairs. Thirteen runs of SVT, longest 82 beats at 159 bpm.  No significant pauses.  Cardiac nuclear imaging test 11/21/2016    No ischemia or prior infarction. No RWMA. EF 53%. Neg EKG on pharm stress test.    Cardiac stress echocardiogram 09/11/2013    Negative maximal stress echo by EKG & echo criteria. No chest pain. EF 60-65%. No new RWMA. Ex time 9 min 31 sec.     Coronary artery disease     status post stenting of the LAD in Oct. 2006/EF 60%    Diabetes mellitus ([facility] [address] [facility]     Hypercholesterolemia     Hypertension      PVC (premature ventricular contraction)        Social History     Socioeconomic History    Marital status:      Spouse name: Not on file    Number of children: Not on file    Years of education: Not on file    Highest education level: Not on file   Occupational History    Not on file   Social Needs    Financial resource strain: Not on file    Food insecurity:     Worry: Not on file     Inability: Not on file    Transportation needs:     Medical: Not on file     Non-medical: Not on file Tobacco Use    Smoking status: Never Smoker    Smokeless tobacco: Never Used   Substance and Sexual Activity    Alcohol use: Yes     Comment: occasional    Drug use: No    Sexual activity: Not on file   Lifestyle    Physical activity:     Days per week: Not on file     Minutes per session: Not on file    Stress: Not on file   Relationships    Social connections:     Talks on phone: Not on file     Gets together: Not on file     Attends Hindu service: Not on file     Active member of club or organization: Not on file     Attends meetings of clubs or organizations: Not on file     Relationship status: Not on file    Intimate partner violence:     Fear of current or ex partner: Not on file     Emotionally abused: Not on file     Physically abused: Not on file     Forced sexual activity: Not on file   Other Topics Concern    Not on file   Social History Narrative    Not on file       History reviewed. No pertinent family history. Past Surgical History:   Procedure Laterality Date    HX CORONARY STENT PLACEMENT  10/23/06    Stenting of LAD (3 x 33-mm Cypher stent)    HX HEART CATHETERIZATION  02/1999    HX HEART CATHETERIZATION  09/2006       Current Outpatient Medications   Medication Sig Dispense Refill    nitroglycerin (NITROQUICK) 0.4 mg SL tablet 1 Tab by SubLINGual route every five (5) minutes as needed. 25 Tab 3    omega 3-dha-epa-fish oil 100-160-1,000 mg cap Take  by mouth.  ERGOCALCIFEROL, VITAMIN D2, (VITAMIN D2 PO) Take  by mouth.  CYANOCOBALAMIN, VITAMIN B-12, (VITAMIN B-12 PO) Take  by mouth.  amLODIPine (NORVASC) 5 mg tablet daily. 0    ibuprofen (MOTRIN) 800 mg tablet Take  by mouth.  metoprolol succinate (TOPROL XL) 25 mg XL tablet Take 1 Tab by mouth daily. 90 Tab 3    clopidogrel (PLAVIX) 75 mg tablet Take 1 Tab by mouth daily. 90 Tab 3    aspirin 81 mg tablet Take 81 mg by mouth daily.       metformin (GLUCOPHAGE) 500 mg tablet Take 500 mg by mouth daily (with breakfast).  simvastatin (ZOCOR) 40 mg tablet Take 40 mg by mouth nightly.  ascorbic acid (VITAMIN C) 500 mg tablet Take 500 mg by mouth daily.  folic acid 489 mcg tablet Take 400 mcg by mouth daily. EKG: unchanged from previous tracings, nonspecific ST and T waves changes, sinus bradycardia, left axis deviation, rotation of the heart,  . ASSESSMENT and PLAN  Encounter Diagnoses   Name Primary?  Atherosclerosis of native coronary artery of native heart without angina pectoris,Stent LAD Oct.2006,EF 55-60% Yes    PVC (premature ventricular contraction)     Essential hypertension     Hypercholesterolemia     Diastolic dysfunction, left ventricle    He has been doing well. He has had no symptoms to indicate angina or cardiac decompensation. He has remained active. He continues to fly an airplane. He is planning on flying until he turns [de-identified]. At this time, we will proceed with follow up stress nuclear cardiac imaging.

## 2020-02-12 ENCOUNTER — OFFICE VISIT (OUTPATIENT)
Dept: CARDIOLOGY CLINIC | Age: 80
End: 2020-02-12

## 2020-02-12 VITALS
DIASTOLIC BLOOD PRESSURE: 78 MMHG | BODY MASS INDEX: 32.21 KG/M2 | HEIGHT: 70 IN | SYSTOLIC BLOOD PRESSURE: 140 MMHG | WEIGHT: 225 LBS | HEART RATE: 62 BPM | OXYGEN SATURATION: 96 %

## 2020-02-12 DIAGNOSIS — I49.3 PVC (PREMATURE VENTRICULAR CONTRACTION): ICD-10-CM

## 2020-02-12 DIAGNOSIS — E78.00 HYPERCHOLESTEROLEMIA: ICD-10-CM

## 2020-02-12 DIAGNOSIS — I25.10 ATHEROSCLEROSIS OF NATIVE CORONARY ARTERY OF NATIVE HEART WITHOUT ANGINA PECTORIS: Primary | ICD-10-CM

## 2020-02-12 DIAGNOSIS — I10 ESSENTIAL HYPERTENSION: ICD-10-CM

## 2020-02-12 DIAGNOSIS — I51.9 DIASTOLIC DYSFUNCTION, LEFT VENTRICLE: ICD-10-CM

## 2020-02-12 RX ORDER — ROSUVASTATIN CALCIUM 20 MG/1
20 TABLET, COATED ORAL
Qty: 30 TAB | Refills: 6 | Status: SHIPPED | OUTPATIENT
Start: 2020-02-12

## 2020-02-12 NOTE — PROGRESS NOTES
HISTORY OF PRESENT ILLNESS  Heraclio Roche is a 78 y.o. male. HPI  He has been feeling well. He has had no chest pain, dyspnea, orthopnea, PND. He denies palpitations, dizziness or syncope. He has had no symptoms to indicate TIA or amaurosis fugax. Although he has not felt any shortness of breath, however his wife has noticed that he does seem to breathe hard when he walks. He is still flying the plane. His cholesterol profile is not quite satisfactory with the LDL at 113 with simvastatin 40 mg a day. His blood pressure is mildly elevated. Cardiac catheterization in September 2006 at the [facility] which demonstrated:    1. A long 80% to 90% segmental stenosis of the proximal and left anterior descending artery all the way up to the left main trunk. The left main trunk, however, is a very small segment which has virtually a double ostium.    2. Large, codominant circumflex artery with a questionable stenosis in the mid segment, which appears to be more foreshortening rather than a true lesion.    3. Patent codominant right coronary artery.    4. Normal left ventricular systolic function, with estimated EF in the 60% range.    The cardiologist at the [facility] recommended that he have coronary artery bypass grafting to the LAD and the distal circumflex branch. Reviewing his coronary angiogram, it was felt that he could be best treated with stenting of the LAD, since the circumflex artery lesion did not appear to be significant.  He subsequently underwent stenting of the LAD on October 23, 2006, successfully, with a 3 x 33-mm Cypher stent.    His LV function was well preserved and ejection fraction was in the 55-60% range by echocardiogram. He did have the PVCs which he has been known to have had for a number of years.   Janis Mendoza  He underwent stress nuclear cardiac imaging as required by the [address] [place] [address]  for his  license on 9/38/50 which demonstrated small area of distal posterolateral wall defect which was partially reversible and there was some defect in the inferior wall consistent with diaphragmatic attenuation. The ejection fraction was estimated in the 51% range. Because of equivocally abnormal stress nuclear cardiac imaging, he underwent stress echocardiogram to clarify on 9/11/13 which demonstrated no significant wall motion abnormalities with exercise to indicate ischemia. The estimated ejection fraction was 55% which increased up to 60-65% with exercise. His cholesterol profile has been satisfactory and his hemoglobin A1c is down to 6.3%. He has had PVCs which appear to be benign and he has had no clinical indication of ventricular tachycardia thus far.    He had repeat stress nuclear cardiac imaging on 10/2/14 as required by the [address] [place] [address]  to maintain his  license. I personally reviewed his perfusion imaging pictures which appears to be essentially unchanged compared to one from 8/27/13. There is a small to moderate size scarring in the basal inferior and mid inferior wall which may well be scar or diaphragmatic attenuation and also some fixed defect in the distal posterolateral wall. There appears to be no reversibility indicating ischemia. Once again, he was found to have PVCs during the test which was reported and observed in the past. He is known to have had the PVCs for many number of years. The ejection fraction was estimated in the 48% range, however he did have reduced ejection fraction in August 2013 by gated spec imaging in the 51% range. However ,during the stress echocardiogram, he was found to have baseline EF In the 55% range which increased up to 60-65% range with exercise at that time in September 2013. There appears to be a great possibility of gating error at the time of stress nuclear cardiac imaging because of the PVCs.  At any rate, there appears to be no significant change in the EF between the stress nuclear cardiac imaging in August 2013 and October 2014.    He underwent the annual stress nuclear cardiac imaging for his  license on 44/55/74, which demonstrated no evidence of significant perfusion imaging abnormalities to indicate the scarring or ischemia.  The ejection fraction was calculated at 42%, which could well be related to a gating error because of the PVCs.                    He has developed an allergic reaction to Lisinopril with lip and tongue swelling and, subsequently, the Lisinopril has been replaced with amlodipine.   He underwent a Holter monitor on 11/16/15 to evaluate the PVCs, which demonstrated baseline normal sinus rhythm. There were multiple PVCs with some paired beats or trigeminy and runs of bigeminy, but no ventricular tachycardia. There were some PACs and thirteen short runs of SVT with the longest of 82 beats at a rate of 159 BPM. There were no significant bradycardia pauses. He had repeat stress nuclear cardiac imaging, which was required for his s license, on 38/01/4795, which demonstrated no significant perfusion abnormalities to indicate ischemia or infarction. Ejection fraction was in the range of 53%.       Review of Systems   Constitutional: Negative for malaise/fatigue and weight loss. HENT: Negative for hearing loss. Eyes: Negative for blurred vision and double vision. Respiratory: Positive for shortness of breath. Cardiovascular: Negative for chest pain, palpitations, orthopnea, claudication, leg swelling and PND. Gastrointestinal: Negative for blood in stool, heartburn and melena. Genitourinary: Positive for frequency. Negative for dysuria, hematuria and urgency. Musculoskeletal: Negative for back pain and joint pain. Skin: Negative for itching and rash. Neurological: Negative for dizziness and loss of consciousness. Psychiatric/Behavioral: Negative for depression and memory loss. Physical Exam   Constitutional: He is oriented to person, place, and time. He appears well-developed and well-nourished.    HENT:   Head: Normocephalic and atraumatic. Eyes: Pupils are equal, round, and reactive to light. Conjunctivae are normal.   Neck: Normal range of motion. Neck supple. No JVD present. Cardiovascular: Normal rate, regular rhythm, S1 normal and S2 normal.  Extrasystoles are present. PMI is not displaced. Exam reveals no gallop and no friction rub. No murmur heard. Pulses:       Carotid pulses are 3+ on the right side and 3+ on the left side. Pulmonary/Chest: Effort normal. He has no rales. Abdominal: Soft. There is no abdominal tenderness. Musculoskeletal:         General: No edema. Neurological: He is alert and oriented to person, place, and time. No cranial nerve deficit. Skin: Skin is warm and dry. Psychiatric: He has a normal mood and affect. His behavior is normal.     Visit Vitals  /78   Pulse 62   Ht 5' 10\" (1.778 m)   Wt 102.1 kg (225 lb)   SpO2 96%   BMI 32.28 kg/m²       Past Medical History:   Diagnosis Date    Cardiac cath 10/23/2006    LM patent. oLAD 85% (3 x 33 mm Cypher stent). mCx 60%. RCA patent.  Cardiac Holter monitor study 11/16/2015    Sinus rhythm, avg HR 69 bpm (range  bpm). Freq VEs, mainly single but also paired, trigeminy & bigeminy. Occasional SVE, mainly single but some pairs. Thirteen runs of SVT, longest 82 beats at 159 bpm.  No significant pauses.  Cardiac nuclear imaging test 11/21/2016    No ischemia or prior infarction. No RWMA. EF 53%. Neg EKG on pharm stress test.    Cardiac stress echocardiogram 09/11/2013    Negative maximal stress echo by EKG & echo criteria. No chest pain. EF 60-65%. No new RWMA. Ex time 9 min 31 sec.     Coronary artery disease     status post stenting of the LAD in Oct. 2006/EF 60%    Diabetes mellitus ([facility] [address]     Hypercholesterolemia     Hypertension      PVC (premature ventricular contraction)        Social History     Socioeconomic History    Marital status:      Spouse name: Not on file    Number of children: Not on file    Years of education: Not on file    Highest education level: Not on file   Occupational History    Not on file   Social Needs    Financial resource strain: Not on file    Food insecurity:     Worry: Not on file     Inability: Not on file    Transportation needs:     Medical: Not on file     Non-medical: Not on file   Tobacco Use    Smoking status: Never Smoker    Smokeless tobacco: Never Used   Substance and Sexual Activity    Alcohol use: Yes     Comment: occasional    Drug use: No    Sexual activity: Not on file   Lifestyle    Physical activity:     Days per week: Not on file     Minutes per session: Not on file    Stress: Not on file   Relationships    Social connections:     Talks on phone: Not on file     Gets together: Not on file     Attends Congregational service: Not on file     Active member of club or organization: Not on file     Attends meetings of clubs or organizations: Not on file     Relationship status: Not on file    Intimate partner violence:     Fear of current or ex partner: Not on file     Emotionally abused: Not on file     Physically abused: Not on file     Forced sexual activity: Not on file   Other Topics Concern    Not on file   Social History Narrative    Not on file       No family history on file. Past Surgical History:   Procedure Laterality Date    HX CORONARY STENT PLACEMENT  10/23/06    Stenting of LAD (3 x 33-mm Cypher stent)    HX HEART CATHETERIZATION  02/1999    HX HEART CATHETERIZATION  09/2006       Current Outpatient Medications   Medication Sig Dispense Refill    nitroglycerin (NITROQUICK) 0.4 mg SL tablet 1 Tab by SubLINGual route every five (5) minutes as needed. 25 Tab 3    omega 3-dha-epa-fish oil 100-160-1,000 mg cap Take  by mouth.  CYANOCOBALAMIN, VITAMIN B-12, (VITAMIN B-12 PO) Take  by mouth.  amLODIPine (NORVASC) 5 mg tablet daily. 0    ibuprofen (MOTRIN) 800 mg tablet Take  by mouth.       metoprolol succinate (TOPROL XL) 25 mg XL tablet Take 1 Tab by mouth daily. 90 Tab 3    clopidogrel (PLAVIX) 75 mg tablet Take 1 Tab by mouth daily. 90 Tab 3    aspirin 81 mg tablet Take 81 mg by mouth daily.  metformin (GLUCOPHAGE) 500 mg tablet Take 500 mg by mouth daily (with breakfast).  simvastatin (ZOCOR) 40 mg tablet Take 40 mg by mouth nightly.  ascorbic acid (VITAMIN C) 500 mg tablet Take 500 mg by mouth daily.  folic acid 354 mcg tablet Take 400 mcg by mouth daily.  ERGOCALCIFEROL, VITAMIN D2, (VITAMIN D2 PO) Take  by mouth. EKG: unchanged from previous tracings, normal sinus rhythm, nonspecific ST and T waves changes, occasional PVC noted, unifocal, left axis deviation, rotation of the heart  . ASSESSMENT and PLAN  Encounter Diagnoses   Name Primary?  Atherosclerosis of native coronary artery of native heart without angina pectoris,Stent LAD Oct.2006,EF 55-60% Yes    PVC (premature ventricular contraction)     Essential hypertension     Hypercholesterolemia     Diastolic dysfunction, left ventricle    He has been doing well. He has had no symptoms to indicate angina or cardiac decompensation. He does not feel shortness of breath subjectively but he has been noted to be breathing hard with exertion by his wife. He does have chronic PVCs but no known history of ventricular tachycardia. His blood pressure is mildly elevated. His initial blood pressure was 160/95 which came down to 140/85 ten mins. later. He was advised to record his blood pressure twice a day for two weeks and bring to our office before we decide to adjust his antihypertensive medication. I would consider replacing metoprolol with carvedilol if needed. His cholesterol profile is not quite satisfactory and I would replace Zocor with Crestor 20 mg a day at this point. His future cardiac care will be carried over to Dr. Vaibhav Hoover in June 2020 upon my detention.  However, he may decide to stay with a local cardiologist in [place]. He will have surveillance stress testing either in six months before he sees Dr. Mohan Slade or with the new cardiologist in [place].

## 2020-02-12 NOTE — PROGRESS NOTES
Jessica Yan. presents today for   Chief Complaint   Patient presents with    Coronary Artery Disease     6 MONTH F/U    Shortness of Breath     Family Member has noticed he has it with extertion       Jessica Yan. preferred language for health care discussion is english/other. Is someone accompanying this pt? Family    Is the patient using any DME equipment during [address]? no    Depression Screening:  3 most recent PHQ Screens 2/12/2020   Little interest or pleasure in doing things Not at all   Feeling down, depressed, irritable, or hopeless Not at all   Total Score PHQ 2 0       Learning Assessment:  Learning Assessment 6/5/2019   PRIMARY LEARNER Patient   HIGHEST LEVEL OF EDUCATION - PRIMARY LEARNER  -   BARRIERS PRIMARY LEARNER -   [facility]    NEED -   LEARNER PREFERENCE PRIMARY DEMONSTRATION     -     -     -   ANSWERED BY patient   RELATIONSHIP SELF       Abuse Screening:  Abuse Screening Questionnaire 2/12/2020   Do you ever feel afraid of your partner? N   Are you in a relationship with someone who physically or mentally threatens you? N   Is it safe for you to go home? Y       Fall Risk  Fall Risk Assessment, last 12 mths 2/12/2020   Able to walk? Yes   Fall in past 12 months? No       Pt currently taking Anticoagulant therapy? no    Coordination of Care:  1. Have you been to the ER, urgent care clinic since your last visit? Hospitalized since your last visit? no    2. Have you seen or consulted any other health care providers outside of the [address] [place] [address] since your last visit? Include any pap smears or colon screening.  no

## 2021-01-13 ENCOUNTER — TRANSCRIBE ORDER (OUTPATIENT)
Dept: INTERVENTIONAL RADIOLOGY/VASCULAR | Age: 81
End: 2021-01-13

## 2021-01-13 DIAGNOSIS — R22.41 FOOT MASS, RIGHT: Primary | ICD-10-CM

## 2021-01-22 ENCOUNTER — HOSPITAL ENCOUNTER (OUTPATIENT)
Dept: ULTRASOUND IMAGING | Age: 81
Discharge: HOME | End: 2021-01-22
Attending: RADIOLOGY | Admitting: RADIOLOGY
Payer: MEDICARE

## 2021-01-22 VITALS
WEIGHT: 224 LBS | RESPIRATION RATE: 18 BRPM | HEART RATE: 56 BPM | HEIGHT: 69 IN | BODY MASS INDEX: 33.18 KG/M2 | SYSTOLIC BLOOD PRESSURE: 133 MMHG | DIASTOLIC BLOOD PRESSURE: 58 MMHG | TEMPERATURE: 97.3 F | OXYGEN SATURATION: 98 %

## 2021-01-22 DIAGNOSIS — R22.41 FOOT MASS, RIGHT: ICD-10-CM

## 2021-01-22 LAB
GLUCOSE BLD STRIP.AUTO-MCNC: 184 MG/DL (ref 70–110)
GLUCOSE BLD STRIP.AUTO-MCNC: 230 MG/DL (ref 70–110)
GLUCOSE BLD STRIP.AUTO-MCNC: 267 MG/DL (ref 70–110)

## 2021-01-22 PROCEDURE — 82962 GLUCOSE BLOOD TEST: CPT

## 2021-01-22 PROCEDURE — 74011636637 HC RX REV CODE- 636/637

## 2021-01-22 PROCEDURE — 76882 US LMTD JT/FCL EVL NVASC XTR: CPT

## 2021-01-22 PROCEDURE — 74011250636 HC RX REV CODE- 250/636: Performed by: RADIOLOGY

## 2021-01-22 RX ORDER — SODIUM CHLORIDE 9 MG/ML
20 INJECTION, SOLUTION INTRAVENOUS CONTINUOUS
Status: DISCONTINUED | OUTPATIENT
Start: 2021-01-22 | End: 2021-01-22 | Stop reason: HOSPADM

## 2021-01-22 RX ORDER — LIDOCAINE HYDROCHLORIDE 10 MG/ML
10 INJECTION INFILTRATION; PERINEURAL
Status: DISCONTINUED | OUTPATIENT
Start: 2021-01-22 | End: 2021-01-22 | Stop reason: HOSPADM

## 2021-01-22 RX ORDER — INSULIN LISPRO 100 [IU]/ML
INJECTION, SOLUTION INTRAVENOUS; SUBCUTANEOUS
Status: COMPLETED
Start: 2021-01-22 | End: 2021-01-22

## 2021-01-22 RX ORDER — MAGNESIUM SULFATE 100 %
4 CRYSTALS MISCELLANEOUS AS NEEDED
Status: DISCONTINUED | OUTPATIENT
Start: 2021-01-22 | End: 2021-01-22 | Stop reason: HOSPADM

## 2021-01-22 RX ORDER — FENTANYL CITRATE 50 UG/ML
25-100 INJECTION, SOLUTION INTRAMUSCULAR; INTRAVENOUS
Status: DISCONTINUED | OUTPATIENT
Start: 2021-01-22 | End: 2021-01-22 | Stop reason: HOSPADM

## 2021-01-22 RX ORDER — SODIUM CHLORIDE 0.9 % (FLUSH) 0.9 %
5-40 SYRINGE (ML) INJECTION EVERY 8 HOURS
Status: DISCONTINUED | OUTPATIENT
Start: 2021-01-22 | End: 2021-01-22 | Stop reason: HOSPADM

## 2021-01-22 RX ORDER — SODIUM CHLORIDE 0.9 % (FLUSH) 0.9 %
5-40 SYRINGE (ML) INJECTION AS NEEDED
Status: DISCONTINUED | OUTPATIENT
Start: 2021-01-22 | End: 2021-01-22 | Stop reason: HOSPADM

## 2021-01-22 RX ORDER — DEXTROSE 50 % IN WATER (D50W) INTRAVENOUS SYRINGE
25-50 AS NEEDED
Status: DISCONTINUED | OUTPATIENT
Start: 2021-01-22 | End: 2021-01-22 | Stop reason: HOSPADM

## 2021-01-22 RX ORDER — INSULIN LISPRO 100 [IU]/ML
INJECTION, SOLUTION INTRAVENOUS; SUBCUTANEOUS ONCE
Status: DISCONTINUED | OUTPATIENT
Start: 2021-01-22 | End: 2021-01-22 | Stop reason: HOSPADM

## 2021-01-22 RX ADMIN — INSULIN LISPRO 9 UNITS: 100 INJECTION, SOLUTION INTRAVENOUS; SUBCUTANEOUS at 11:21

## 2021-01-22 RX ADMIN — SODIUM CHLORIDE 20 ML/HR: 900 INJECTION, SOLUTION INTRAVENOUS at 10:50

## 2021-01-22 NOTE — PERIOP NOTES
Phase 2 Recovery Summary    Report received from [address]ve:    01/22/21 1031 01/22/21 1329 01/22/21 1353   BP: (!) 145/69  (!) 133/58   Pulse: (!) 54 (!) 53 (!) 56   Resp: 18  18   Temp: 97.5 °F (36.4 °C)  97.3 °F (36.3 °C)   SpO2: 96% 100% 98%   Weight: 101.6 kg (224 lb)     Height: 5' 9\" (1.753 m)         oriented to time, place, person and situation          Lines and Drains  Peripheral Intravenous Line:  Removed prior to discharge        Patient assisted to chair with minimal assist. Pateint tolerating liquids well. Discharge discussed with patient, and family over the phone due to covid restrictions. No questions or concerns at this time. Patient had time to ask any questions. Discharge medications reviewed with patient and appropriate educational materials and side effects teaching were provided. Patient discharged to home with his wife without incident.        Rene Olivia RN

## 2021-01-22 NOTE — PROGRESS NOTES
Pt educated about procedure and sedation, verbalizes understanding. PTA medications reviewed, radiologist aware.  Will continue to monitor

## 2021-01-22 NOTE — PERIOP NOTES
Patient [facility] 267   Patient states he had coffee with vanilla creamer this morning. He took his 500 mg Metformin at 0815. Patient also took 75 mg Plavix and 81 mg ASA this morning. Call to Ultrasound and  informed     Dr. Neeraj Simpson. Per Dr. Neeraj Simpson,   treat elevated blood glucose per protocol. Okay for test to proceed with morning Plavix and ASA. 9 units Humalog administered per protocol.

## 2021-01-22 NOTE — PROGRESS NOTES
Biopsy cancelled by radiologist, unable to locate lesion on US. Dr. Manju Tenorio will discuss with Dr. Kathleen Thapa. Dilcia Johnson RN in [facility] aware.

## 2021-01-22 NOTE — PERIOP NOTES
Pre-Op Summary    Pt arrived via car with family/friend and is oriented to time, place, person and situation. Patient with steady gait with none assistive devices. Visit Vitals  BP (!) 145/69 (BP 1 Location: Left arm, BP Patient Position: Sitting)   Pulse (!) 54   Temp 97.5 °F (36.4 °C)   Resp 18   Ht 5' 9\" (1.753 m)   Wt 101.6 kg (224 lb)   SpO2 96%   BMI 33.08 kg/m²       Peripheral IV located on Left hand . Patients belongings are located [facility]. Patient's point of contact is friend Alejandro Mandujano  and their contact number is: [phone]. They will be leaving and coming back. They are able to receive medication information. They will be their ride home.